# Patient Record
Sex: FEMALE | Race: WHITE
[De-identification: names, ages, dates, MRNs, and addresses within clinical notes are randomized per-mention and may not be internally consistent; named-entity substitution may affect disease eponyms.]

---

## 2018-02-05 ENCOUNTER — HOSPITAL ENCOUNTER (OUTPATIENT)
Dept: HOSPITAL 61 - SURGPAT | Age: 58
Discharge: HOME | End: 2018-02-05
Attending: ORTHOPAEDIC SURGERY
Payer: COMMERCIAL

## 2018-02-05 DIAGNOSIS — I10: ICD-10-CM

## 2018-02-05 DIAGNOSIS — E11.9: ICD-10-CM

## 2018-02-05 DIAGNOSIS — M17.12: Primary | ICD-10-CM

## 2018-02-05 LAB
BACTERIA,URINE: 0 /HPF
BILIRUBIN,URINE: NEGATIVE
CLARITY,URINE: CLEAR
COLOR,URINE: YELLOW
GLUCOSE,URINE: NEGATIVE MG/DL
NITRITE,URINE: NEGATIVE
PH,URINE: 6
PROTEIN,URINE: NEGATIVE MG/DL
RBC,URINE: (no result) /HPF (ref 0–2)
SPECIFIC GRAVITY,URINE: 1.02
SQUAMOUS EPITHELIAL CELL,UR: (no result) /LPF
UROBILINOGEN,URINE: 0.2 MG/DL
WBC,URINE: (no result) /HPF (ref 0–4)

## 2018-02-05 PROCEDURE — 87641 MR-STAPH DNA AMP PROBE: CPT

## 2018-02-05 PROCEDURE — 36415 COLL VENOUS BLD VENIPUNCTURE: CPT

## 2018-02-05 PROCEDURE — 87086 URINE CULTURE/COLONY COUNT: CPT

## 2018-02-05 PROCEDURE — 81001 URINALYSIS AUTO W/SCOPE: CPT

## 2018-02-19 ENCOUNTER — HOSPITAL ENCOUNTER (INPATIENT)
Dept: HOSPITAL 61 - OPSVCIP | Age: 58
LOS: 3 days | Discharge: HOME HEALTH | DRG: 470 | End: 2018-02-22
Attending: ORTHOPAEDIC SURGERY | Admitting: ORTHOPAEDIC SURGERY
Payer: COMMERCIAL

## 2018-02-19 DIAGNOSIS — Z88.6: ICD-10-CM

## 2018-02-19 DIAGNOSIS — Z88.0: ICD-10-CM

## 2018-02-19 DIAGNOSIS — M17.12: Primary | ICD-10-CM

## 2018-02-19 DIAGNOSIS — Z91.018: ICD-10-CM

## 2018-02-19 LAB
INR: 1 (ref 0.8–1.1)
PARTIAL THROMBOPLASTIN TIME: 27 SEC (ref 24–38)
POC GLUCOSE: 138 MG/DL (ref 70–99)
PROTHROMBIN TIME PATIENT: 12.1 SEC (ref 11.7–14)

## 2018-02-19 PROCEDURE — 73560 X-RAY EXAM OF KNEE 1 OR 2: CPT

## 2018-02-19 PROCEDURE — 86901 BLOOD TYPING SEROLOGIC RH(D): CPT

## 2018-02-19 PROCEDURE — 88305 TISSUE EXAM BY PATHOLOGIST: CPT

## 2018-02-19 PROCEDURE — 97530 THERAPEUTIC ACTIVITIES: CPT

## 2018-02-19 PROCEDURE — 97116 GAIT TRAINING THERAPY: CPT

## 2018-02-19 PROCEDURE — 88311 DECALCIFY TISSUE: CPT

## 2018-02-19 PROCEDURE — 97535 SELF CARE MNGMENT TRAINING: CPT

## 2018-02-19 PROCEDURE — 82962 GLUCOSE BLOOD TEST: CPT

## 2018-02-19 PROCEDURE — 85014 HEMATOCRIT: CPT

## 2018-02-19 PROCEDURE — 85730 THROMBOPLASTIN TIME PARTIAL: CPT

## 2018-02-19 PROCEDURE — 86900 BLOOD TYPING SEROLOGIC ABO: CPT

## 2018-02-19 PROCEDURE — 97162 PT EVAL MOD COMPLEX 30 MIN: CPT

## 2018-02-19 PROCEDURE — 97150 GROUP THERAPEUTIC PROCEDURES: CPT

## 2018-02-19 PROCEDURE — 97110 THERAPEUTIC EXERCISES: CPT

## 2018-02-19 PROCEDURE — 36415 COLL VENOUS BLD VENIPUNCTURE: CPT

## 2018-02-19 PROCEDURE — 0SRD0J9 REPLACEMENT OF LEFT KNEE JOINT WITH SYNTHETIC SUBSTITUTE, CEMENTED, OPEN APPROACH: ICD-10-PCS

## 2018-02-19 PROCEDURE — 86850 RBC ANTIBODY SCREEN: CPT

## 2018-02-19 PROCEDURE — 85610 PROTHROMBIN TIME: CPT

## 2018-02-19 PROCEDURE — 97165 OT EVAL LOW COMPLEX 30 MIN: CPT

## 2018-02-19 PROCEDURE — 85018 HEMOGLOBIN: CPT

## 2018-02-19 RX ADMIN — FENTANYL CITRATE 1 MCG: 50 INJECTION INTRAMUSCULAR; INTRAVENOUS at 15:22

## 2018-02-19 RX ADMIN — Medication 1 MG: at 16:52

## 2018-02-19 RX ADMIN — FENTANYL CITRATE 1 MCG: 50 INJECTION INTRAMUSCULAR; INTRAVENOUS at 09:49

## 2018-02-19 RX ADMIN — SODIUM CHLORIDE, SODIUM LACTATE, POTASSIUM CHLORIDE, AND CALCIUM CHLORIDE 1 MLS/HR: .6; .31; .03; .02 INJECTION, SOLUTION INTRAVENOUS at 06:51

## 2018-02-19 RX ADMIN — KETOROLAC TROMETHAMINE 1 MLS/HR: 30 INJECTION, SOLUTION INTRAMUSCULAR at 23:01

## 2018-02-19 RX ADMIN — VANCOMYCIN HYDROCHLORIDE 1 MLS/HR: 1 INJECTION, POWDER, FOR SOLUTION INTRAVENOUS at 07:20

## 2018-02-19 RX ADMIN — WARFARIN SODIUM 1 MG: 7.5 TABLET ORAL at 16:52

## 2018-02-19 RX ADMIN — VANCOMYCIN HYDROCHLORIDE 1 MLS/HR: 1 INJECTION, POWDER, FOR SOLUTION INTRAVENOUS at 22:49

## 2018-02-19 RX ADMIN — KETOROLAC TROMETHAMINE 1: 30 INJECTION, SOLUTION INTRAMUSCULAR at 08:00

## 2018-02-19 RX ADMIN — TRANEXAMIC ACID 1 MLS/HR: 100 INJECTION, SOLUTION INTRAVENOUS at 07:50

## 2018-02-19 RX ADMIN — FENTANYL CITRATE 1 MCG: 50 INJECTION INTRAMUSCULAR; INTRAVENOUS at 10:05

## 2018-02-19 RX ADMIN — PROCHLORPERAZINE EDISYLATE 1 MG: 5 INJECTION INTRAMUSCULAR; INTRAVENOUS at 09:49

## 2018-02-19 RX ADMIN — DEXTROSE AND SODIUM CHLORIDE 1 MLS/HR: 5; .45 INJECTION, SOLUTION INTRAVENOUS at 19:00

## 2018-02-19 RX ADMIN — MELOXICAM 1 MG: 7.5 TABLET ORAL at 06:52

## 2018-02-19 RX ADMIN — TRANEXAMIC ACID 1 MLS/HR: 100 INJECTION, SOLUTION INTRAVENOUS at 08:50

## 2018-02-19 RX ADMIN — MEPERIDINE HYDROCHLORIDE 1 MG: 25 INJECTION INTRAMUSCULAR; INTRAVENOUS; SUBCUTANEOUS at 10:17

## 2018-02-19 RX ADMIN — CELECOXIB 1 MG: 200 CAPSULE ORAL at 21:40

## 2018-02-19 RX ADMIN — DEXTROSE AND SODIUM CHLORIDE 1 MLS/HR: 5; .45 INJECTION, SOLUTION INTRAVENOUS at 16:13

## 2018-02-20 LAB
HEMATOCRIT: 29.5 % (ref 36–47)
HEMOGLOBIN: 9.8 G/DL (ref 12–15.5)
INR: 1.3 (ref 0.8–1.1)
MEAN CORPUSCULAR HGB CONC: 33 G/DL (ref 31–37)
PROTHROMBIN TIME PATIENT: 15.5 SEC (ref 11.7–14)

## 2018-02-20 RX ADMIN — LISINOPRIL 1 MG: 20 TABLET ORAL at 08:45

## 2018-02-20 RX ADMIN — WARFARIN SODIUM 1 MG: 5 TABLET ORAL at 16:31

## 2018-02-20 RX ADMIN — Medication 1 EACH: at 10:32

## 2018-02-20 RX ADMIN — Medication 1 MG: at 07:53

## 2018-02-20 RX ADMIN — VANCOMYCIN HYDROCHLORIDE 1 MLS/HR: 1 INJECTION, POWDER, FOR SOLUTION INTRAVENOUS at 07:19

## 2018-02-20 RX ADMIN — HYDROCODONE BITARTRATE AND ACETAMINOPHEN 1 TAB: 10; 325 TABLET ORAL at 18:13

## 2018-02-20 RX ADMIN — OXYCODONE HYDROCHLORIDE AND ACETAMINOPHEN 1 TAB: 5; 325 TABLET ORAL at 07:53

## 2018-02-20 RX ADMIN — ZOLPIDEM TARTRATE 1 MG: 5 TABLET ORAL at 22:39

## 2018-02-20 RX ADMIN — Medication 1 MG: at 16:31

## 2018-02-20 RX ADMIN — CELECOXIB 1 MG: 200 CAPSULE ORAL at 20:50

## 2018-02-20 RX ADMIN — KETOROLAC TROMETHAMINE 1 MLS/HR: 30 INJECTION, SOLUTION INTRAMUSCULAR at 04:43

## 2018-02-20 RX ADMIN — OXYCODONE HYDROCHLORIDE AND ACETAMINOPHEN 1 TAB: 5; 325 TABLET ORAL at 03:32

## 2018-02-20 RX ADMIN — MULTIPLE VITAMINS W/ MINERALS TAB 1 TAB: TAB at 08:45

## 2018-02-20 RX ADMIN — CELECOXIB 1 MG: 200 CAPSULE ORAL at 08:44

## 2018-02-20 RX ADMIN — OXYCODONE HYDROCHLORIDE AND ACETAMINOPHEN 1 TAB: 7.5; 325 TABLET ORAL at 21:45

## 2018-02-20 RX ADMIN — OXYCODONE HYDROCHLORIDE AND ACETAMINOPHEN 1 TAB: 5; 325 TABLET ORAL at 12:29

## 2018-02-20 RX ADMIN — SENNOSIDES AND DOCUSATE SODIUM 1 TAB: 8.6; 5 TABLET ORAL at 08:45

## 2018-02-21 LAB
HEMATOCRIT: 28 % (ref 36–47)
HEMOGLOBIN: 9.4 G/DL (ref 12–15.5)
INR: 1.4 (ref 0.8–1.1)
MEAN CORPUSCULAR HGB CONC: 34 G/DL (ref 31–37)
PROTHROMBIN TIME PATIENT: 16.5 SEC (ref 11.7–14)

## 2018-02-21 RX ADMIN — MULTIPLE VITAMINS W/ MINERALS TAB 1 TAB: TAB at 07:41

## 2018-02-21 RX ADMIN — HYDROCODONE BITARTRATE AND ACETAMINOPHEN 1 TAB: 10; 325 TABLET ORAL at 00:43

## 2018-02-21 RX ADMIN — HYDROCODONE BITARTRATE AND ACETAMINOPHEN 1 TAB: 10; 325 TABLET ORAL at 22:35

## 2018-02-21 RX ADMIN — ZOLPIDEM TARTRATE 1 MG: 5 TABLET ORAL at 21:08

## 2018-02-21 RX ADMIN — WARFARIN SODIUM 1 MG: 5 TABLET ORAL at 16:03

## 2018-02-21 RX ADMIN — CELECOXIB 1 MG: 200 CAPSULE ORAL at 21:08

## 2018-02-21 RX ADMIN — Medication 1 MG: at 16:03

## 2018-02-21 RX ADMIN — LISINOPRIL 1 MG: 20 TABLET ORAL at 09:00

## 2018-02-21 RX ADMIN — Medication 1 EACH: at 14:18

## 2018-02-21 RX ADMIN — HYDROCODONE BITARTRATE AND ACETAMINOPHEN 1 TAB: 10; 325 TABLET ORAL at 09:43

## 2018-02-21 RX ADMIN — Medication 1 MG: at 07:41

## 2018-02-21 RX ADMIN — SENNOSIDES AND DOCUSATE SODIUM 1 TAB: 8.6; 5 TABLET ORAL at 07:42

## 2018-02-21 RX ADMIN — HYDROCODONE BITARTRATE AND ACETAMINOPHEN 1 TAB: 10; 325 TABLET ORAL at 03:52

## 2018-02-21 RX ADMIN — CELECOXIB 1 MG: 200 CAPSULE ORAL at 07:41

## 2018-02-21 RX ADMIN — HYDROCODONE BITARTRATE AND ACETAMINOPHEN 1 TAB: 10; 325 TABLET ORAL at 12:56

## 2018-02-21 RX ADMIN — HYDROCODONE BITARTRATE AND ACETAMINOPHEN 1 TAB: 10; 325 TABLET ORAL at 19:28

## 2018-02-21 RX ADMIN — HYDROCODONE BITARTRATE AND ACETAMINOPHEN 1 TAB: 10; 325 TABLET ORAL at 16:03

## 2018-02-21 RX ADMIN — HYDROCODONE BITARTRATE AND ACETAMINOPHEN 1 TAB: 10; 325 TABLET ORAL at 06:57

## 2018-02-22 VITALS — DIASTOLIC BLOOD PRESSURE: 50 MMHG | SYSTOLIC BLOOD PRESSURE: 93 MMHG

## 2018-02-22 LAB
HEMATOCRIT: 27.3 % (ref 36–47)
HEMOGLOBIN: 9.4 G/DL (ref 12–15.5)
INR: 1.5 (ref 0.8–1.1)
MEAN CORPUSCULAR HGB CONC: 34 G/DL (ref 31–37)
PROTHROMBIN TIME PATIENT: 17.2 SEC (ref 11.7–14)

## 2018-02-22 RX ADMIN — Medication 1 MG: at 07:44

## 2018-02-22 RX ADMIN — CELECOXIB 1 MG: 200 CAPSULE ORAL at 07:45

## 2018-02-22 RX ADMIN — HYDROCODONE BITARTRATE AND ACETAMINOPHEN 1 TAB: 10; 325 TABLET ORAL at 07:47

## 2018-02-22 RX ADMIN — HYDROCODONE BITARTRATE AND ACETAMINOPHEN 1 TAB: 10; 325 TABLET ORAL at 14:33

## 2018-02-22 RX ADMIN — LISINOPRIL 1 MG: 20 TABLET ORAL at 07:45

## 2018-02-22 RX ADMIN — WARFARIN SODIUM 1 MG: 5 TABLET ORAL at 14:32

## 2018-02-22 RX ADMIN — HYDROCODONE BITARTRATE AND ACETAMINOPHEN 1 TAB: 10; 325 TABLET ORAL at 11:13

## 2018-02-22 RX ADMIN — MULTIPLE VITAMINS W/ MINERALS TAB 1 TAB: TAB at 07:45

## 2018-02-22 RX ADMIN — SENNOSIDES AND DOCUSATE SODIUM 1 TAB: 8.6; 5 TABLET ORAL at 07:44

## 2018-02-22 RX ADMIN — HYDROCODONE BITARTRATE AND ACETAMINOPHEN 1 TAB: 10; 325 TABLET ORAL at 04:49

## 2018-02-22 RX ADMIN — Medication 1 EACH: at 10:39

## 2018-02-22 RX ADMIN — HYDROCODONE BITARTRATE AND ACETAMINOPHEN 1 TAB: 10; 325 TABLET ORAL at 01:43

## 2018-10-08 ENCOUNTER — HOSPITAL ENCOUNTER (OUTPATIENT)
Dept: HOSPITAL 61 - SURGPAT | Age: 58
Discharge: HOME | End: 2018-10-08
Attending: ORTHOPAEDIC SURGERY
Payer: COMMERCIAL

## 2018-10-08 DIAGNOSIS — Z01.818: Primary | ICD-10-CM

## 2018-10-08 DIAGNOSIS — M17.11: ICD-10-CM

## 2018-10-08 DIAGNOSIS — Z88.5: ICD-10-CM

## 2018-10-08 DIAGNOSIS — Z88.0: ICD-10-CM

## 2018-10-08 LAB
APTT BLD: 27 SEC (ref 24–38)
APTT PPP: YELLOW S
BACTERIA #/AREA URNS HPF: 0 /HPF
BASOPHILS # BLD AUTO: 0.1 X10^3/UL (ref 0–0.2)
BASOPHILS NFR BLD: 1 % (ref 0–3)
BILIRUB UR QL STRIP: NEGATIVE
EOSINOPHIL NFR BLD: 0.3 X10^3/UL (ref 0–0.7)
EOSINOPHIL NFR BLD: 4 % (ref 0–3)
ERYTHROCYTE [DISTWIDTH] IN BLOOD BY AUTOMATED COUNT: 14.4 % (ref 11.5–14.5)
FIBRINOGEN PPP-MCNC: CLEAR MG/DL
HCT VFR BLD CALC: 41.9 % (ref 36–47)
HGB BLD-MCNC: 14.5 G/DL (ref 12–15.5)
LYMPHOCYTES # BLD: 3.3 X10^3/UL (ref 1–4.8)
LYMPHOCYTES NFR BLD AUTO: 37 % (ref 24–48)
MCH RBC QN AUTO: 30 PG (ref 25–35)
MCHC RBC AUTO-ENTMCNC: 35 G/DL (ref 31–37)
MCV RBC AUTO: 86 FL (ref 79–100)
MONO #: 0.6 X10^3/UL (ref 0–1.1)
MONOCYTES NFR BLD: 6 % (ref 0–9)
NEUT #: 4.8 X10^3UL (ref 1.8–7.7)
NEUTROPHILS NFR BLD AUTO: 52 % (ref 31–73)
NITRITE UR QL STRIP: NEGATIVE
PH UR STRIP: 7.5 [PH]
PLATELET # BLD AUTO: 201 X10^3/UL (ref 140–400)
PROT UR STRIP-MCNC: NEGATIVE MG/DL
PROTHROMBIN TIME: 12.8 SEC (ref 11.7–14)
RBC # BLD AUTO: 4.85 X10^6/UL (ref 3.5–5.4)
RBC #/AREA URNS HPF: 0 /HPF (ref 0–2)
SQUAMOUS #/AREA URNS LPF: (no result) /LPF
UROBILINOGEN UR-MCNC: 0.2 MG/DL
WBC # BLD AUTO: 9.1 X10^3/UL (ref 4–11)
WBC #/AREA URNS HPF: 0 /HPF (ref 0–4)

## 2018-10-08 PROCEDURE — 85730 THROMBOPLASTIN TIME PARTIAL: CPT

## 2018-10-08 PROCEDURE — 85651 RBC SED RATE NONAUTOMATED: CPT

## 2018-10-08 PROCEDURE — 87641 MR-STAPH DNA AMP PROBE: CPT

## 2018-10-08 PROCEDURE — 36415 COLL VENOUS BLD VENIPUNCTURE: CPT

## 2018-10-08 PROCEDURE — 81001 URINALYSIS AUTO W/SCOPE: CPT

## 2018-10-08 PROCEDURE — 85610 PROTHROMBIN TIME: CPT

## 2018-10-08 PROCEDURE — 87086 URINE CULTURE/COLONY COUNT: CPT

## 2018-10-08 PROCEDURE — 85025 COMPLETE CBC W/AUTO DIFF WBC: CPT

## 2018-10-22 ENCOUNTER — HOSPITAL ENCOUNTER (INPATIENT)
Dept: HOSPITAL 61 - OPSVCIP | Age: 58
LOS: 3 days | Discharge: HOME HEALTH | DRG: 470 | End: 2018-10-25
Attending: ORTHOPAEDIC SURGERY | Admitting: ORTHOPAEDIC SURGERY
Payer: COMMERCIAL

## 2018-10-22 VITALS — SYSTOLIC BLOOD PRESSURE: 107 MMHG | DIASTOLIC BLOOD PRESSURE: 58 MMHG

## 2018-10-22 VITALS — SYSTOLIC BLOOD PRESSURE: 106 MMHG | DIASTOLIC BLOOD PRESSURE: 62 MMHG

## 2018-10-22 VITALS — SYSTOLIC BLOOD PRESSURE: 95 MMHG | DIASTOLIC BLOOD PRESSURE: 62 MMHG

## 2018-10-22 VITALS — DIASTOLIC BLOOD PRESSURE: 54 MMHG | SYSTOLIC BLOOD PRESSURE: 106 MMHG

## 2018-10-22 VITALS — DIASTOLIC BLOOD PRESSURE: 53 MMHG | SYSTOLIC BLOOD PRESSURE: 100 MMHG

## 2018-10-22 VITALS — DIASTOLIC BLOOD PRESSURE: 60 MMHG | SYSTOLIC BLOOD PRESSURE: 110 MMHG

## 2018-10-22 VITALS — SYSTOLIC BLOOD PRESSURE: 113 MMHG | DIASTOLIC BLOOD PRESSURE: 58 MMHG

## 2018-10-22 VITALS — BODY MASS INDEX: 34.07 KG/M2 | WEIGHT: 230 LBS | HEIGHT: 69 IN

## 2018-10-22 VITALS — DIASTOLIC BLOOD PRESSURE: 69 MMHG | SYSTOLIC BLOOD PRESSURE: 121 MMHG

## 2018-10-22 VITALS — SYSTOLIC BLOOD PRESSURE: 109 MMHG | DIASTOLIC BLOOD PRESSURE: 62 MMHG

## 2018-10-22 DIAGNOSIS — Z91.048: ICD-10-CM

## 2018-10-22 DIAGNOSIS — Z91.018: ICD-10-CM

## 2018-10-22 DIAGNOSIS — M17.11: Primary | ICD-10-CM

## 2018-10-22 DIAGNOSIS — Z96.652: ICD-10-CM

## 2018-10-22 DIAGNOSIS — Z88.5: ICD-10-CM

## 2018-10-22 DIAGNOSIS — Z88.0: ICD-10-CM

## 2018-10-22 LAB
APTT BLD: 28 SEC (ref 24–38)
PROTHROMBIN TIME: 12.8 SEC (ref 11.7–14)

## 2018-10-22 PROCEDURE — 85610 PROTHROMBIN TIME: CPT

## 2018-10-22 PROCEDURE — 88311 DECALCIFY TISSUE: CPT

## 2018-10-22 PROCEDURE — 0SRC069 REPLACEMENT OF RIGHT KNEE JOINT WITH OXIDIZED ZIRCONIUM ON POLYETHYLENE SYNTHETIC SUBSTITUTE, CEMENTED, OPEN APPROACH: ICD-10-PCS | Performed by: ORTHOPAEDIC SURGERY

## 2018-10-22 PROCEDURE — 86850 RBC ANTIBODY SCREEN: CPT

## 2018-10-22 PROCEDURE — 85730 THROMBOPLASTIN TIME PARTIAL: CPT

## 2018-10-22 PROCEDURE — 86900 BLOOD TYPING SEROLOGIC ABO: CPT

## 2018-10-22 PROCEDURE — C1769 GUIDE WIRE: HCPCS

## 2018-10-22 PROCEDURE — 88305 TISSUE EXAM BY PATHOLOGIST: CPT

## 2018-10-22 PROCEDURE — 99406 BEHAV CHNG SMOKING 3-10 MIN: CPT

## 2018-10-22 PROCEDURE — A7015 AEROSOL MASK USED W NEBULIZE: HCPCS

## 2018-10-22 PROCEDURE — 86901 BLOOD TYPING SEROLOGIC RH(D): CPT

## 2018-10-22 PROCEDURE — 85018 HEMOGLOBIN: CPT

## 2018-10-22 PROCEDURE — 73560 X-RAY EXAM OF KNEE 1 OR 2: CPT

## 2018-10-22 PROCEDURE — 85014 HEMATOCRIT: CPT

## 2018-10-22 PROCEDURE — 36415 COLL VENOUS BLD VENIPUNCTURE: CPT

## 2018-10-22 RX ADMIN — HYDROCODONE BITARTRATE AND ACETAMINOPHEN PRN TAB: 10; 325 TABLET ORAL at 21:20

## 2018-10-22 RX ADMIN — HYDROCODONE BITARTRATE AND ACETAMINOPHEN PRN TAB: 10; 325 TABLET ORAL at 14:29

## 2018-10-22 RX ADMIN — FENTANYL CITRATE PRN MCG: 50 INJECTION INTRAMUSCULAR; INTRAVENOUS at 09:57

## 2018-10-22 RX ADMIN — FENTANYL CITRATE PRN MCG: 50 INJECTION INTRAMUSCULAR; INTRAVENOUS at 10:18

## 2018-10-22 RX ADMIN — HYDROCODONE BITARTRATE AND ACETAMINOPHEN PRN TAB: 10; 325 TABLET ORAL at 17:37

## 2018-10-22 RX ADMIN — DEXTROSE AND SODIUM CHLORIDE SCH MLS/HR: 5; .45 INJECTION, SOLUTION INTRAVENOUS at 14:30

## 2018-10-22 RX ADMIN — CELECOXIB SCH MG: 100 CAPSULE ORAL at 21:09

## 2018-10-22 RX ADMIN — DEXTROSE AND SODIUM CHLORIDE SCH MLS/HR: 5; .45 INJECTION, SOLUTION INTRAVENOUS at 21:03

## 2018-10-22 RX ADMIN — FENTANYL CITRATE PRN MCG: 50 INJECTION INTRAMUSCULAR; INTRAVENOUS at 10:04

## 2018-10-22 RX ADMIN — GABAPENTIN SCH MG: 300 CAPSULE ORAL at 21:09

## 2018-10-22 RX ADMIN — Medication SCH MG: at 16:34

## 2018-10-22 RX ADMIN — TIZANIDINE PRN MG: 4 TABLET ORAL at 21:18

## 2018-10-22 RX ADMIN — FENTANYL CITRATE PRN MCG: 50 INJECTION INTRAMUSCULAR; INTRAVENOUS at 10:52

## 2018-10-22 RX ADMIN — FENTANYL CITRATE PRN MCG: 50 INJECTION INTRAMUSCULAR; INTRAVENOUS at 11:45

## 2018-10-22 NOTE — PDOC4
Operative Note


Operative Note


Date of procedure: 10/22/2018





Surgeon: Levi Alexander





Assistant: JORY Burt





Preoperative diagnosis advanced primary right knee degenerative joint disease





Postoperative diagnosis: Same





Procedure performed: Right total knee arthroplasty





Anesthesia: Gen.





Tourniquet time: 47 minutes





Blood loss: 50 mL





Components inserted: Leo and nephew Oxinium size 5 femur, size 3 journey 2 

tibial baseplate, 23 biconvex patella, 9 mm thick polyethylene articular insert





Findings: Advanced primary degenerative joint disease of right knee





Reason for procedure: Patient is a very pleasant 58-year-old female who is 

happy with her outcome after a left total knee arthroplasty with myself a short 

while ago. He had initially seen her for bilateral knee pain. Her clinical and 

radiographic examination were consistent with the above preoperative diagnosis. 

She had tried and failed conservative therapies including interarticular 

injections, weight loss, anti-inflammatories and despite this her pain 

progressed, her left side was worse we did decide first. When she had recovered 

well enough from surgery, she wished to proceed with the right side. We 

reviewed the risks, benefits, and alternatives and she elected to proceed with 

right total knee arthroplasty.





Description of procedure: Patient was greeted in the preoperative holding area 

by myself for the correct extremity was verified and marked. She was taken back 

to the operative suite and her antibiotic started as she was brought back. Once 

in the operating room, she was transferred gently supine to the operating table 

and secured the bed with all pressure points padded. She underwent successful 

induction of a general anesthetic. A padded bump laterally at her hip and 

padded bar across foot of the bed to help maintain her leg in 90 passively at 

her knee were applied to the OR table. Nonsterile tourniquet taped in place to 

her right upper thigh. Examination under anesthesia demonstrated range of 

motion from 0-135, right knee was stable to varus and valgus in extension and 

30 of flexion. We then proceeded prep and drape right lower extremity in our 

usual sterile fashion including an Ioban Sanders. We then conducted our 

standard preoperative timeout. I palpated and marked surface surface anatomy 

and carmelina a line from my standard anterior midline skin incision. Tourniquet was 

insufflated after an Esmarch was applied to the right lower extremity. 

Tourniquet was set at 2 or 50 mmHg. I then incised skin with a scalpel and 

dissected down to the extensor mechanism with electrocautery, cauterizing 

bleeders as a were encountered. Identified the quadriceps tendon, borders of 

the patella and patellar tendon as well as tibial tubercle. After this, I 

performed my standard medial parapatellar arthrotomy. I then performed my 

medial release with combination of Wheeler and electrocautery. I then bluntly 

dissected the fat pad off the posterior aspect of the patellar tendon and 

protected the tendon with an Army-[place] while I excised the fat pad. The knee 

was then flexed, I marked the epicondylar axis and Whitesides line. After this, 

I took down the cruciate ligaments. I then gained entry to the distal femur 

with the drill, decompressing as I entered. I then placed my long 

intramedullary guide melba and impacted my guide into position followed by 

placing 3 pins his care. I then made my distal femoral cut after placing my Z 

retractors. I removed all loose bony debris. I then removed my distal femoral 

cutting block, directed my attention to incising, it was a size 5. After this, 

I placed my distal femoral 5 in 1 cutting block in position, I impacted it to 

fully seated and placed my threaded pins. I then made my distal femoral cuts 

after repositioned my retractors. I removed all loose bony pieces. I took care 

to remove any osteophytes posteriorly. After this, we repositioned our 

retractors and a padded our popliteal retractor. The tibia was subluxed 

anteriorly and I used my extra medullary tibial cutting guide and pinned this 

into position I then made my proximal tibial cut. The bony remnant was 

delivered from the operative field with circumferential electrocautery. The leg 

was brought out into extension after the retractors were removed. The spacer 

block and drop melba confirmed good alignment and stability. A lamina  

was then introduced into the knee, the menisci were excised, leaving a rim 

peripherally for later identification. We flexed the knee and repositioned at 

all of our retractors. I then sized and pinned into place and a trial tibial 

baseplate, size 3. We then reamed and punched for the fins. I then impacted the 

trial femoral component in place, we then reamed and punched for the cam 

portion. After this, a trial polyethylene was introduced, size 10, but she does 

not quite have full extension and I downsized to a 9. This gave her range of 

motion from 0-140, knee that was stable to varus and valgus in extension and 

mid flexion. I then directed my attention to the patella and sized and reamed 

for a 23 biconvex patella. With all trial components and there was excellent 

patellar tracking. All trial components were then removed and the bony surfaces 

were thoroughly irrigated. After this, we proceeded cement in place our tibial 

component followed by femoral component taking care to remove excess bone 

cement. A trial 9 mm articular insert was placed and secured. The cement was 

allowed to polymerize with the leg in extension. I then clamped my patellar 

button in place with cement as well. I then injected my periarticular mixture 

into the sonia-incisional soft tissues. After cement hardened, took knee through 

range of motion tested stability again and was happy with the above size. We 

then removed the trial articular insert, thoroughly irrigated the operative 

field again and then secured and clamped in place the 9 mm thick polyethylene 

articular insert. Range of motion and stability were the same. Tourniquet was 

then let down and some bleeders were cauterized. She had some oozing from the 

distal femur and expose bone and therefore I placed a 1/8 inch Hemovac exiting 

superolaterally from the knee. After this,, the arthrotomy was closed with 

simple interrupted #1 Vicryl, arthrotomy closure tested in flexion and no 

extravasation of blood was noted. After this, inverted interrupted 2-0 Vicryl 

in a multilayered fashion was used for subcutaneous tissue and running 4-0 

Monocryl in a buried subcuticular fashion was used for skin. Prior to wound 

closure all counts correct 2. No complications. At the conclusion of the 

surgery, the leg was cleansed and dried and our [employer] danii wound 

dressing was applied. She is awake from anesthesia and transferred gently 

supine to the recovery room cart and taken to PACU in a stable and expected 

condition. Postoperative plan is to admit her to the joint center for DVT and 

antibiotic prophylaxis as well as to begin her rehabilitation. She can weight-

bear as tolerated.











LEVI ALEXANDER II, MD Oct 22, 2018 09:51

## 2018-10-23 VITALS — SYSTOLIC BLOOD PRESSURE: 97 MMHG | DIASTOLIC BLOOD PRESSURE: 55 MMHG

## 2018-10-23 VITALS — SYSTOLIC BLOOD PRESSURE: 132 MMHG | DIASTOLIC BLOOD PRESSURE: 79 MMHG

## 2018-10-23 VITALS — DIASTOLIC BLOOD PRESSURE: 61 MMHG | SYSTOLIC BLOOD PRESSURE: 105 MMHG

## 2018-10-23 VITALS — DIASTOLIC BLOOD PRESSURE: 55 MMHG | SYSTOLIC BLOOD PRESSURE: 105 MMHG

## 2018-10-23 VITALS — DIASTOLIC BLOOD PRESSURE: 64 MMHG | SYSTOLIC BLOOD PRESSURE: 125 MMHG

## 2018-10-23 LAB
HCT VFR BLD CALC: 33.9 % (ref 36–47)
HGB BLD-MCNC: 11.6 G/DL (ref 12–15.5)
MCHC RBC AUTO-ENTMCNC: 34 G/DL (ref 31–37)
PROTHROMBIN TIME: 14.7 SEC (ref 11.7–14)

## 2018-10-23 RX ADMIN — HYDROCODONE BITARTRATE AND ACETAMINOPHEN PRN TAB: 10; 325 TABLET ORAL at 06:25

## 2018-10-23 RX ADMIN — TIZANIDINE PRN MG: 4 TABLET ORAL at 20:36

## 2018-10-23 RX ADMIN — Medication PRN EACH: at 12:19

## 2018-10-23 RX ADMIN — GABAPENTIN SCH MG: 300 CAPSULE ORAL at 20:36

## 2018-10-23 RX ADMIN — CELECOXIB SCH MG: 100 CAPSULE ORAL at 08:05

## 2018-10-23 RX ADMIN — CELECOXIB SCH MG: 100 CAPSULE ORAL at 20:36

## 2018-10-23 RX ADMIN — HYDROCODONE BITARTRATE AND ACETAMINOPHEN PRN TAB: 10; 325 TABLET ORAL at 10:10

## 2018-10-23 RX ADMIN — Medication SCH MG: at 17:42

## 2018-10-23 RX ADMIN — HYDROCODONE BITARTRATE AND ACETAMINOPHEN PRN TAB: 10; 325 TABLET ORAL at 01:59

## 2018-10-23 RX ADMIN — HYDROCODONE BITARTRATE AND ACETAMINOPHEN PRN TAB: 10; 325 TABLET ORAL at 20:37

## 2018-10-23 RX ADMIN — LISINOPRIL SCH MG: 20 TABLET ORAL at 09:00

## 2018-10-23 RX ADMIN — HYDROCODONE BITARTRATE AND ACETAMINOPHEN PRN TAB: 10; 325 TABLET ORAL at 17:43

## 2018-10-23 RX ADMIN — HYDROCODONE BITARTRATE AND ACETAMINOPHEN PRN TAB: 10; 325 TABLET ORAL at 13:15

## 2018-10-23 RX ADMIN — MULTIPLE VITAMINS W/ MINERALS TAB SCH TAB: TAB at 08:05

## 2018-10-23 RX ADMIN — SENNOSIDES AND DOCUSATE SODIUM SCH TAB: 8.6; 5 TABLET ORAL at 08:05

## 2018-10-23 RX ADMIN — Medication SCH MG: at 08:05

## 2018-10-23 NOTE — DISCH
DISCHARGE INSTRUCTIONS


Condition on Discharge


Condition on Discharge:  Stable





Activity After Discharge


Activity Instructions for Disc:  Walk in house


Bathing Instructions:  Shower-keep dressing dry, No Tub Bath until see 


Lifting Instructions after Dis:  No heavy lifting, No pulling or pushing, Do 

not lift >10 pounds


Exercise Instruction after Dis:  Exercise per therapy


Driving Instructions after Dis:  Do not drive


Weight Bearing Status after Di:  No restrictions, Full weight bearing, As 

tolerated





Diet after Discharge


Diet after Discharge:  Diabetic No Calorie Level


Diet Texture:  Regular


Liquid Texture:  Thin Liquid


Swallowing Supervision:  None needed





Wound Incision Care


Wound/Incision Care:  Ice to area for comfort, Keep wound/cast CDI, Keep wound 

elevated, Do not change dressing





Checks after Discharge


Checks after discharge:  Check blood sugar, ac/hs, Check your Temp as needed





Community/Resources/Services


Services at Discharge:  Home Health Care Services





Contacting the  after DC


Call your doctor for:  Concerns you may have





Follow-Up


Follow up with:  Mike in 2 wks





Treatment/Equipment after DC


Adaptive Equipment Issued:  Front wheeled walker





Warfarin Follow-Up


Warfarin Follow UP:  per Pharmacy











YADIRA ALEXANDER II, MD Oct 23, 2018 09:45

## 2018-10-23 NOTE — DISCH
DISCHARGE WITH HOME HEALTH


DISCHARGE INFORMATION:


Discharge Date:  Oct 24, 2018


Final Diagnosis:


Advanced right knee degenerative joint disease, status post arthroplasty


Condition on Discharge:  Stable





HOME HEALTH:


Face to Face:


I certify this patient is under my care and that I, or a nurse practitioner or 

physician's assistant working with me, had a face to face encounter that meets 

the physician face to face encounter requirements with this patient on [].


Medical Complications:  S/P Joint Replacement


Skilled Nursing For:  Admin/Educate Injections


Physical Therapy For:  Evalulation/Treatment


Occupational Therapy For:  Evaluation/Treatment


Pt Meets Homebound Status:  Poor coordination w/ amb., Unsteady balance w/ amb,





POST DISCHARGE ORDERS:


Activity Instructions for Disc:  Walk in house


Weight Bearing Status after Di:  No restrictions, Full weight bearing, As 

tolerated


Bathing Instructions:  Shower-keep dressing dry, No Tub Bath until see 


Wound/Incision Care:  Ice to area for comfort, Keep wound/cast CDI, Keep wound 

elevated, Do not change dressing





CHECKS AFTER DISCHARGE:


Checks after discharge:  Check blood sugar, ac/hs, Check your Temp as needed





FOLLOW-UP:


Follow up with:  Mike in 2 wks


Warfarin Follow UP:  per Pharmacy





TREATMENT/EQUIPMENT ORDERS:


Adaptive Equipment Issued:  Front wheeled walker





CERTIFICATION STATEMENT:


Certification Statement:


Certification Statement: Based on the above finding, I certify that this 

patient is confined to the home and needs intermittent skilled nursing care, 

physical therapy and/or speech therapy, or continues to need occupational 

therapy.~ This patient is under my care, and I have initiated the establishment 

of the plan of care.~ This patient will be followed by myself or a community 

physician who will periodically review the plan of care.


Home Meds


Reported Medications


Lisinopril/Hydrochlorothiazide (LISINOPRIL-HCTZ 20-12.5 MG TAB) 1 Each Tablet, 

1 TAB PO DAILY, #30 TAB 5 Refills


   10/8/18


Ferrous Sulfate (FERROUS SULFATE) 325 Mg Tablet, 325 MG PO DAILY, TAB


   10/8/18


Tizanidine Hcl (TIZANIDINE HCL) 4 Mg Tablet, 4 MG PO HS PRN for MUSCLE SPASMS, 

TAB


   10/8/18


Gabapentin (GABAPENTIN) 600 Mg Tablet, 600 MG PO HS, TAB


   10/8/18


Discontinued Reported Medications


Meloxicam (MELOXICAM) 15 Mg Tablet, 15 MG PO DAILY, TAB


   10/8/18











YADIRA ALEXANDER II, MD Oct 23, 2018 09:48

## 2018-10-23 NOTE — PDOC
ORTHO PROGRESS NOTES


Subjective


She feels like her pain is tolerable. She has been working on the 

rehabilitation exercises while sitting.


Vitals





Vital Signs








  Date Time  Temp Pulse Resp B/P (MAP) Pulse Ox O2 Delivery O2 Flow Rate FiO2


 


10/23/18 08:06  74 22 105/55 (72)  Room Air  


 


10/23/18 07:31     96   


 


10/23/18 06:30 97.8       





 97.8       


 


10/22/18 12:42       2.0 








Labs





Laboratory Tests








Test


 10/22/18


06:25 10/23/18


04:00


 


Prothrombin Time


 12.8 SEC


(11.7-14.0) 14.7 SEC


(11.7-14.0)


 


Prothromb Time International


Ratio 1.0 (0.8-1.1) 


 1.2 (0.8-1.1) 





 


Activated Partial


Thromboplast Time 28 SEC (24-38) 


 





 


Hemoglobin


 


 11.6 g/dL


(12.0-15.5)


 


Hematocrit


 


 33.9 %


(36.0-47.0)


 


Mean Corpuscular Hemoglobin


Concent 


 34 g/dL


(31-37)








Laboratory Tests








Test


 10/23/18


04:00


 


Hemoglobin


 11.6 g/dL


(12.0-15.5)


 


Hematocrit


 33.9 %


(36.0-47.0)


 


Mean Corpuscular Hemoglobin


Concent 34 g/dL


(31-37)


 


Prothrombin Time


 14.7 SEC


(11.7-14.0)


 


Prothromb Time International


Ratio 1.2 (0.8-1.1) 











Notes


Dressing has some bloody drainage at the inferior portion. An effusion is 

present. Range of motion is 10 to 80. Normal motor and sensation is present 

distally


Assessment and Plan


I think she may be ready for discharge with home health care tomorrow. We will 

see how she does today.











YADIRA ALEXANDER II, MD Oct 23, 2018 09:50

## 2018-10-24 VITALS — DIASTOLIC BLOOD PRESSURE: 62 MMHG | SYSTOLIC BLOOD PRESSURE: 124 MMHG

## 2018-10-24 VITALS — SYSTOLIC BLOOD PRESSURE: 110 MMHG | DIASTOLIC BLOOD PRESSURE: 54 MMHG

## 2018-10-24 LAB
HCT VFR BLD CALC: 32.1 % (ref 36–47)
HGB BLD-MCNC: 11.1 G/DL (ref 12–15.5)
MCHC RBC AUTO-ENTMCNC: 35 G/DL (ref 31–37)
PROTHROMBIN TIME: 18.1 SEC (ref 11.7–14)

## 2018-10-24 RX ADMIN — OXYCODONE HYDROCHLORIDE AND ACETAMINOPHEN PRN TAB: 7.5; 325 TABLET ORAL at 12:07

## 2018-10-24 RX ADMIN — FENTANYL CITRATE PRN MCG: 50 INJECTION INTRAMUSCULAR; INTRAVENOUS at 16:01

## 2018-10-24 RX ADMIN — Medication SCH MG: at 08:34

## 2018-10-24 RX ADMIN — HYDROCODONE BITARTRATE AND ACETAMINOPHEN PRN TAB: 10; 325 TABLET ORAL at 01:40

## 2018-10-24 RX ADMIN — HYDROCODONE BITARTRATE AND ACETAMINOPHEN PRN TAB: 10; 325 TABLET ORAL at 04:42

## 2018-10-24 RX ADMIN — SENNOSIDES AND DOCUSATE SODIUM SCH TAB: 8.6; 5 TABLET ORAL at 08:34

## 2018-10-24 RX ADMIN — TIZANIDINE PRN MG: 4 TABLET ORAL at 21:49

## 2018-10-24 RX ADMIN — Medication PRN EACH: at 11:04

## 2018-10-24 RX ADMIN — GABAPENTIN SCH MG: 300 CAPSULE ORAL at 21:49

## 2018-10-24 RX ADMIN — FENTANYL CITRATE PRN MCG: 50 INJECTION INTRAMUSCULAR; INTRAVENOUS at 12:09

## 2018-10-24 RX ADMIN — OXYCODONE HYDROCHLORIDE AND ACETAMINOPHEN PRN TAB: 7.5; 325 TABLET ORAL at 21:49

## 2018-10-24 RX ADMIN — CELECOXIB SCH MG: 100 CAPSULE ORAL at 08:41

## 2018-10-24 RX ADMIN — LISINOPRIL SCH MG: 20 TABLET ORAL at 08:44

## 2018-10-24 RX ADMIN — CELECOXIB SCH MG: 100 CAPSULE ORAL at 21:49

## 2018-10-24 RX ADMIN — MULTIPLE VITAMINS W/ MINERALS TAB SCH TAB: TAB at 08:34

## 2018-10-24 RX ADMIN — HYDROCODONE BITARTRATE AND ACETAMINOPHEN PRN TAB: 10; 325 TABLET ORAL at 12:06

## 2018-10-24 RX ADMIN — OXYCODONE HYDROCHLORIDE AND ACETAMINOPHEN PRN TAB: 7.5; 325 TABLET ORAL at 17:29

## 2018-10-24 RX ADMIN — FENTANYL CITRATE PRN MCG: 50 INJECTION INTRAMUSCULAR; INTRAVENOUS at 20:09

## 2018-10-24 RX ADMIN — Medication SCH MG: at 16:01

## 2018-10-24 NOTE — PDOC
ORTHO PROGRESS NOTES


Subjective


Patient states knee very painful.


Post-op Day:  2


Procedure


Right Total Knee Arthroplasty


Vitals





Vital Signs








  Date Time  Temp Pulse Resp B/P (MAP) Pulse Ox O2 Delivery O2 Flow Rate FiO2


 


10/24/18 05:00 96.8 79 24 124/62 (82) 94 Room Air  





 96.8       








Labs





Laboratory Tests








Test


 10/23/18


04:00 10/24/18


04:00


 


Hemoglobin


 11.6 g/dL


(12.0-15.5) 11.1 g/dL


(12.0-15.5)


 


Hematocrit


 33.9 %


(36.0-47.0) 32.1 %


(36.0-47.0)


 


Mean Corpuscular Hemoglobin


Concent 34 g/dL


(31-37) 35 g/dL


(31-37)


 


Prothrombin Time


 14.7 SEC


(11.7-14.0) 18.1 SEC


(11.7-14.0)


 


Prothromb Time International


Ratio 1.2 (0.8-1.1) 


 1.6 (0.8-1.1) 











Laboratory Tests








Test


 10/24/18


04:00


 


Hemoglobin


 11.1 g/dL


(12.0-15.5)


 


Hematocrit


 32.1 %


(36.0-47.0)


 


Mean Corpuscular Hemoglobin


Concent 35 g/dL


(31-37)


 


Prothrombin Time


 18.1 SEC


(11.7-14.0)


 


Prothromb Time International


Ratio 1.6 (0.8-1.1) 











Assessment and Plan


Knee with swelling


Dressing with old dried blood visible


N/V intact distally


encourage PT and knee flexion.


Pain control











FARNAZ SHELTON Oct 24, 2018 07:34

## 2018-10-24 NOTE — PATHOLOGY
[facility] Accession Number: [id number]

.                                                                01

Material submitted:                                        .

BONE,RIGHT KNEE

.                                                                01

Clinical history:                                          .

Knee pain

.                                                                02

**********************************************************************

Diagnosis:

Segments of bone and soft tissue, right total knee arthroplasty:

- Advanced degenerative arthritis.

.

(JPM:vjm;10/23/2018)

[facility]/10/23/2018

**********************************************************************

.                                                                02

Electronically signed:                                     .

Nas Perez MD, Pathologist

NPI- [id number]

.                                                                01

Gross description:                                         .

The specimen is received in formalin, labeled "Monika Leal, right knee

bone", consists of multiple segments of tan-yellow bone, yellow lobulated

and gray-white fibrous tissue containing recognizable portions of tibial

plateau, patella,and meniscus measuring 11.5 x 9.0 x 2.5 cm in aggregate.

Eburnation and peripheral osteophytes are identified.  The attached

fibrous soft tissue is tan-brown.  Representative tissue is submitted in

A1 after decalcification.

([facility]; 10/22/2018)

SHS/SHS

.                                                                02

Pathologist provided ICD-10:

M17.11

.                                                                02

CPT                                                        .

002737, 181823

Specimen Comment: A courtesy copy of this report has been sent to

Specimen Comment: [phone], [phone].

Specimen Comment: Report sent to  / DR AKHTAR

Specimen Comment: A duplicate report has been generated due to demographic updates.

***Performed at:  01

   [facility]

   [address]  [id number]

   MD Michi Elias MD Phone:  [phone]

***Performed at:  02

   [facility]

   [address] [place], [place]  [id number]

   MD Nas Perez MD Phone:  [phone]

## 2018-10-25 VITALS — DIASTOLIC BLOOD PRESSURE: 64 MMHG | SYSTOLIC BLOOD PRESSURE: 105 MMHG

## 2018-10-25 VITALS
DIASTOLIC BLOOD PRESSURE: 63 MMHG | SYSTOLIC BLOOD PRESSURE: 99 MMHG | DIASTOLIC BLOOD PRESSURE: 63 MMHG | SYSTOLIC BLOOD PRESSURE: 99 MMHG

## 2018-10-25 VITALS — SYSTOLIC BLOOD PRESSURE: 99 MMHG | DIASTOLIC BLOOD PRESSURE: 63 MMHG

## 2018-10-25 LAB
HCT VFR BLD CALC: 34.8 % (ref 36–47)
HGB BLD-MCNC: 11.6 G/DL (ref 12–15.5)
MCHC RBC AUTO-ENTMCNC: 34 G/DL (ref 31–37)
PROTHROMBIN TIME: 17 SEC (ref 11.7–14)

## 2018-10-25 RX ADMIN — OXYCODONE HYDROCHLORIDE AND ACETAMINOPHEN PRN TAB: 7.5; 325 TABLET ORAL at 02:59

## 2018-10-25 RX ADMIN — Medication SCH MG: at 08:57

## 2018-10-25 RX ADMIN — MULTIPLE VITAMINS W/ MINERALS TAB SCH TAB: TAB at 08:57

## 2018-10-25 RX ADMIN — LISINOPRIL SCH MG: 20 TABLET ORAL at 08:57

## 2018-10-25 RX ADMIN — OXYCODONE HYDROCHLORIDE AND ACETAMINOPHEN PRN TAB: 7.5; 325 TABLET ORAL at 07:28

## 2018-10-25 RX ADMIN — CELECOXIB SCH MG: 100 CAPSULE ORAL at 08:57

## 2018-10-25 RX ADMIN — OXYCODONE HYDROCHLORIDE AND ACETAMINOPHEN PRN TAB: 7.5; 325 TABLET ORAL at 10:48

## 2018-10-25 RX ADMIN — SENNOSIDES AND DOCUSATE SODIUM SCH TAB: 8.6; 5 TABLET ORAL at 08:57

## 2018-10-25 RX ADMIN — Medication PRN EACH: at 10:29

## 2018-10-25 NOTE — PDOC3
Discharge Summary


Visit Information


Date of Admission:  Oct 22, 2018


Date of Discharge:  Oct 25, 2018


Admitting Diagnosis:  advanced right knee primary degenerative joint disease





Brief Hospital Course


Allergies





 Allergies








Coded Allergies Type Severity Reaction Last Updated Verified


 


  Penicillins Allergy Intermediate hives 2/19/18 Yes


 


  adhesive tape Allergy Unknown TAPE USED ON KNEE SURGERY, CAUSED BLISTERS 10/25

/18 Yes


 


  coconut Adverse Reaction Intermediate Rash 2/19/18 Yes


 


  morphine Adverse Reaction Intermediate Nausea and Vomiting 2/19/18 Yes








Vital Signs





Vital Signs








  Date Time  Temp Pulse Resp B/P (MAP) Pulse Ox O2 Delivery O2 Flow Rate FiO2


 


10/25/18 08:57  76  99/63    


 


10/25/18 07:28      Room Air  


 


10/25/18 05:47 98.0  20  99   





 98.0       








Lab Results





Laboratory Tests








Test


 10/24/18


04:00 10/25/18


04:43


 


Hemoglobin


 11.1 g/dL


(12.0-15.5) 11.6 g/dL


(12.0-15.5)


 


Hematocrit


 32.1 %


(36.0-47.0) 34.8 %


(36.0-47.0)


 


Mean Corpuscular Hemoglobin


Concent 35 g/dL


(31-37) 34 g/dL


(31-37)


 


Prothrombin Time


 18.1 SEC


(11.7-14.0) 17.0 SEC


(11.7-14.0)


 


Prothromb Time International


Ratio 1.6 (0.8-1.1) 


 1.4 (0.8-1.1) 











Laboratory Tests








Test


 10/25/18


04:43


 


Hemoglobin


 11.6 g/dL


(12.0-15.5)


 


Hematocrit


 34.8 %


(36.0-47.0)


 


Mean Corpuscular Hemoglobin


Concent 34 g/dL


(31-37)


 


Prothrombin Time


 17.0 SEC


(11.7-14.0)


 


Prothromb Time International


Ratio 1.4 (0.8-1.1) 











Brief Hospital Course


Ms. Leal  is a 58 old female who presented to my outpatient orthopedic 

surgery clinic with complaints of severe and progressive  pain that failed 

conservative therapies including injections.  We had a discussion of the risks, 

benefits, alternatives to total knee arthroplasty and she elected to proceed.  

She tolerated surgery well and recovered well from anesthesia in the PACU.  She 

was then taken to the joint Center for care and observation.  She did receive PT

, OT, DVT and antibiotic prophylaxis.  She recovered well from surgery and 

remained hemodynamically stable and afebrile throughout the hospitalization.  

Pain was controlled on oral pain medicine at the time of discharge.  Good 

progress was made with therapy throughout the hospitalization, and activities 

of daily living were accomplished by the patient. The incision was clean dry 

and intact and the operative extremity had normal motor and sensation.





Discharge Information


Condition at Discharge:  Stable


Follow Up:  Weeks


Disposition/Orders:  D/C to Home w/ HH


Scheduled


Ferrous Sulfate (Ferrous Sulfate) 325 Mg Tablet, 325 MG PO DAILY, (Reported)


   Entered as Reported by: GASTON HENDRICKSON on 10/8/18 1020


   Last Taken: Unknown Dose on 10/21/18      Last Action: HELD on 10/22/18 0936 

by PURA ALEXANDER MD


Gabapentin (Gabapentin) 600 Mg Tablet, 600 MG PO HS, (Reported)


   Entered as Reported by: GASTON HENDRICKSON on 10/8/18 1015


   Last Taken: Unknown Dose on 10/21/18      Last Action: Converted on 10/22/18 

0936 by PURA ALEXANDER MD


Lisinopril/Hydrochlorothiazide (Lisinopril-Hctz 20-12.5 Mg Tab) 1 Each Tablet, 

1 TAB PO DAILY, #30 Ref 5 (Reported)


   Entered as Reported by: GASTON HENDRICKSON on 10/8/18 1144


   Last Taken: Unknown Dose on 10/21/18      Last Action: Converted on 10/22/18 

0936 by PURA ALEXANDER MD





Scheduled PRN


Tizanidine Hcl (Tizanidine Hcl) 4 Mg Tablet, 4 MG PO HS PRN for MUSCLE SPASMS, (

Reported)


   Entered as Reported by: GASTON HENDRICKSON on 10/8/18 1016


   Last Taken: Unknown Dose on 10/21/18      Last Action: Converted on 10/22/18 

0936 by PURA ALEXANDER MD





Discontinued Medications


Meloxicam (Meloxicam) 15 Mg Tablet, 15 MG PO DAILY, (Reported)


   Entered as Reported by: GASTON HENDRICKSON on 10/8/18 1015


   Last Taken: Unknown Dose on 10/21/18      Last Action: HELD on 10/22/18 0936 

by PURA ALEXANDER MD





Patient Instructions


Patient Instructions


She will be discharged home.  Home health care has been set up.  We will get 

her started on outpatient therapy as soon as we are able.  The patient will be 

on Coumadin for a month.  She can weight-bear as tolerated.  Worrisome signs 

and symptoms that should prompt a phone call to my office were discussed.  We'

ll see her back in 2 weeks, sooner should a problem arise.











YADIRA ALEXANDER II, MD Oct 25, 2018 13:06

## 2018-10-25 NOTE — PDOC
ORTHO PROGRESS NOTES


Subjective


Shantell's pain is about the same. No new concerns.


Vitals





Vital Signs








  Date Time  Temp Pulse Resp B/P (MAP) Pulse Ox O2 Delivery O2 Flow Rate FiO2


 


10/25/18 08:57  76  99/63    


 


10/25/18 07:28      Room Air  


 


10/25/18 05:47 98.0  20  99   





 98.0       








Labs





Laboratory Tests








Test


 10/24/18


04:00 10/25/18


04:43


 


Hemoglobin


 11.1 g/dL


(12.0-15.5) 11.6 g/dL


(12.0-15.5)


 


Hematocrit


 32.1 %


(36.0-47.0) 34.8 %


(36.0-47.0)


 


Mean Corpuscular Hemoglobin


Concent 35 g/dL


(31-37) 34 g/dL


(31-37)


 


Prothrombin Time


 18.1 SEC


(11.7-14.0) 17.0 SEC


(11.7-14.0)


 


Prothromb Time International


Ratio 1.6 (0.8-1.1) 


 1.4 (0.8-1.1) 











Laboratory Tests








Test


 10/25/18


04:43


 


Hemoglobin


 11.6 g/dL


(12.0-15.5)


 


Hematocrit


 34.8 %


(36.0-47.0)


 


Mean Corpuscular Hemoglobin


Concent 34 g/dL


(31-37)


 


Prothrombin Time


 17.0 SEC


(11.7-14.0)


 


Prothromb Time International


Ratio 1.4 (0.8-1.1) 











Notes


She is awake and alert and sitting in a chair. Her soft tissues show some 

effusion or edema but look like they're healing appropriately. For a small 

amount of drainage at the dressing. She remains neurovascularly intact in her 

operative extremity


Assessment and Plan


She can be discharged home with home health care. Okay to weight-bear as 

tolerated. Coumadin for 1 month. We will see her back in 2 weeks.











YADIRA ALEXANDER II, MD Oct 25, 2018 13:07

## 2019-02-04 ENCOUNTER — HOSPITAL ENCOUNTER (OUTPATIENT)
Dept: HOSPITAL 61 - KCIC US | Age: 59
Discharge: HOME | End: 2019-02-04
Attending: ORTHOPAEDIC SURGERY
Payer: COMMERCIAL

## 2019-02-04 DIAGNOSIS — M25.862: Primary | ICD-10-CM

## 2019-02-04 PROCEDURE — 76881 US COMPL JOINT R-T W/IMG: CPT

## 2019-02-04 NOTE — KCIC
Examination: Ultrasound left lower extremity

 

HISTORY: History of posterior left knee cyst/lump in the posterior thigh 

region.

 

COMPARISON: None available

 

FINDINGS:

 

Ultrasound of the left posterior thigh and popliteal region are performed.

No evidence of  cysts or masses identified in the posterior thigh or the 

popliteal region. No evidence of DVT in the popliteal region.

 

IMPRESSION:

 

No evidence of deep venous thrombosis or cyst or masses identified in the 

posterior thigh and gluteal region.

 

Electronically signed by: Juanito Duran MD (2/4/2019 8:56 AM) [place] [id number]

## 2020-10-20 ENCOUNTER — HOSPITAL ENCOUNTER (EMERGENCY)
Dept: HOSPITAL 61 - ER | Age: 60
Discharge: HOME | End: 2020-10-20
Payer: MEDICAID

## 2020-10-20 VITALS — WEIGHT: 230.38 LBS | BODY MASS INDEX: 36.16 KG/M2 | HEIGHT: 67 IN

## 2020-10-20 VITALS — SYSTOLIC BLOOD PRESSURE: 155 MMHG | DIASTOLIC BLOOD PRESSURE: 87 MMHG
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
## 2020-10-20 DIAGNOSIS — Z91.018: ICD-10-CM

## 2020-10-20 DIAGNOSIS — Z88.5: ICD-10-CM

## 2020-10-20 DIAGNOSIS — S63.051A: Primary | ICD-10-CM

## 2020-10-20 DIAGNOSIS — M19.041: ICD-10-CM
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
## 2020-10-20 DIAGNOSIS — F17.200: ICD-10-CM

## 2020-10-20 DIAGNOSIS — Z88.0: ICD-10-CM

## 2020-10-20 DIAGNOSIS — Z88.8: ICD-10-CM

## 2020-10-20 DIAGNOSIS — E11.9: ICD-10-CM

## 2020-10-20 DIAGNOSIS — I10: ICD-10-CM
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
## 2020-10-20 PROCEDURE — 99283 EMERGENCY DEPT VISIT LOW MDM: CPT

## 2020-10-20 PROCEDURE — 29125 APPL SHORT ARM SPLINT STATIC: CPT

## 2020-10-20 PROCEDURE — 73130 X-RAY EXAM OF HAND: CPT

## 2020-10-20 NOTE — RAD
HAND RIGHT 3V

 

History: Reason: rt hand pain injury x1 month ago / Spl. Instructions:  / 

History: 

 

Technique: 3 views right hand.

 

Comparison: None.

 

Findings:

Normal alignment. No fracture. Advanced first metacarpophalangeal 

degenerative changes with radial subluxation

Fragmentation.

 

Impression: 

1.  No acute osseous abnormality.

2.  Advanced first carpometacarpal DJD with subluxation and fragmentation.

 

Electronically signed by: Tomy Leong DO (10/20/2020 12:56 PM) [id number]

## 2020-10-20 NOTE — PHYS DOC
Past Medical History


Past Medical History:  Diabetes-Type II, Hypertension


Past Surgical History:  Appendectomy, Other


Additional Past Surgical Histo:  laproscopic, bilateral knee replacement


Smoking Status:  Current Every Day Smoker


Alcohol Use:  None


Drug Use:  None





General Adult


EDM:


Chief Complaint:  HAND PROBLEM





HPI:


HPI:





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 

that were not sharp in.  She states when she went to cut a thick stand plant 

that she had to use both hands and when this happened she felt a sharp pain in 

the right knuckle and then had swelling.  She states since then she is continue 

to work but she has noticed pain when she goes to make a fist or tries to grasp 

something.  She states it is a sharp shooting pain and a slight burning just 

over the knuckle area.  The sharp shooting pain will go up into her anterior 

arm.  She denies numbness or tingling, coolness of the extremity, skin color 

changes.  He states that she is currently on Diflucan and gabapentin for 

neuropathy.  Patient has had bilateral knee replacements by Dr. Gee.  Patient 

has a history of diabetes, smoker, hypertension, appendectomy.  She rates her 

pain with movement at a 7 out of 10.  She states she does not want any other 

pain medicines.





Review of Systems:


Review of Systems:


Constitutional:   Denies fever or chills. []


Eyes:   Denies change in visual acuity. []


HENT:   Denies nasal congestion or sore throat. [] 


Respiratory:   Denies cough or shortness of breath. [] 


Cardiovascular:   Denies chest pain.  Right middle knuckle 2+ edema. [] 


GI:   Denies abdominal pain, nausea, vomiting, bloody stools or diarrhea. [] 


:  Denies dysuria. [] 


Musculoskeletal:   Denies back pain or joint pain.  Right hand [] 


Integument:   Denies rash. [] 


Neurologic:   Denies headache, focal weakness or sensory changes. [] 


Endocrine:   Denies polyuria or polydipsia. [] 


Lymphatic:  Denies swollen glands. [] 


Psychiatric:  Denies depression or anxiety. []





Heart Score:


Risk Factors:


Risk Factors:  DM, Current or recent (<one month) smoker, HTN, HLP, family 

history of CAD, obesity.


Risk Scores:


Score 0 - 3:  2.5% MACE over next 6 weeks - Discharge Home


Score 4 - 6:  20.3% MACE over next 6 weeks - Admit for Clinical Observation


Score 7 - 10:  72.7% MACE over next 6 weeks - Early Invasive Strategies





Allergies:


Allergies:





Allergies








Coded Allergies Type Severity Reaction Last Updated Verified


 


  Penicillins Allergy Intermediate hives 18 Yes


 


  adhesive tape Allergy Unknown TAPE USED ON KNEE SURGERY, CAUSED BLISTERS 

10/25/18 Yes


 


  coconut Adverse Reaction Intermediate Rash 18 Yes


 


  morphine Adverse Reaction Intermediate Nausea and Vomiting 18 Yes











Physical Exam:


PE:





Constitutional: Well developed, well nourished, no acute distress, non-toxic 

appearance. []


HENT: Normocephalic, atraumatic, bilateral external ears normal, oropharynx 

moist, no oral exudates, nose normal. []


Eyes: PERRLA, EOMI, conjunctiva normal, no discharge. [] 


Neck: Normal range of motion, no tenderness, supple, no stridor. [] 


Cardiovascular:Heart rate regular rhythm, no murmur []


Lungs & Thorax:  Bilateral breath sounds clear to auscultation []


Abdomen: Bowel sounds normal, soft, no tenderness, no masses, no pulsatile 

masses. [] 


Skin: Warm, dry, no erythema, no rash. [] 


Back: No tenderness, no CVA tenderness. [] 


Extremities: Right middle knuckle and finger tenderness, no cyanosis, no 

clubbing, right middle finger ROM not intact, right knuckle 2+ edema. [] 


Neurologic: Alert and oriented X 3, normal motor function, normal sensory 

function, no focal deficits noted. []


Psychologic: Affect normal, judgement normal, mood normal. []





Current Patient Data:


Vital Signs:





                                   Vital Signs








  Date Time  Temp Pulse Resp B/P (MAP) Pulse Ox O2 Delivery O2 Flow Rate FiO2


 


10/20/20 12:00 97.2 85 18 155/87 (109) 95 Room Air  





 97.2       











EKG:


EKG:


[]





Radiology/Procedures:


Radiology/Procedures:


[]


Impression:


                            [facility]


                    [address]  [place], [place] [id number] [phone]


                                        


                                 IMAGING REPORT





                                     Signed





PATIENT: BEVERLY BELL  ACCOUNT: [id number]     MRN#: [id number]


: 1960           LOCATION: ER              AGE: 60


SEX: F                    EXAM DT: 10/20/20         ACCESSION#: [id number]


STATUS: REG ER            ORD. PHYSICIAN: VANESSA NOBLES


REASON: rt hand pain injury x1 month ago


PROCEDURE: HAND RIGHT 3V





HAND RIGHT 3V


 


History: Reason: rt hand pain injury x1 month ago / Spl. Instructions:  / 


History: 


 


Technique: 3 views right hand.


 


Comparison: None.


 


Findings:


Normal alignment. No fracture. Advanced first metacarpophalangeal 


degenerative changes with radial subluxation


Fragmentation.


 


Impression: 


1.  No acute osseous abnormality.


2.  Advanced first carpometacarpal DJD with subluxation and fragmentation.


 


Electronically signed by: Tomy Leong DO (10/20/2020 12:56 PM) [id number]














DICTATED and SIGNED BY:     TOMY LEONG DO


DATE:     10/20/20 125





Course & Med Decision Making:


Course & Med Decision Making


Pertinent Labs and Imaging studies reviewed. (See chart for details)





Radial pulse strong present.  Alert and oriented x4.  Patient ambulatory steady 

gait.  Patient can make a fist but it is weaker than the left hand.  Movement 

causes pain.  Patient rates her pain a 7 out of 10.  Pain is mostly with 

movement.  Cap refills less than 2 seconds.  No joint laxities.





X-ray shows Impression: 


1.  No acute osseous abnormality.


2.  Advanced first carpometacarpal DJD with subluxation and fragmentation.





Patient placed in a Volar splint and to follow up with [facility]. Patient to 

continue taking diclofenac and gabapentin for pain as she has requested.





***Splint assessment: Neurovascularly intact post splint replacement with good 

fit.





Patient's extremity symptoms have stabilized well they have been evaluated in 

the department and are appropriate for outpatient follow-up.  No evidence of 

compartment syndrome, neurologic injury, vascular injury, open joint, open 

fracture, tendon laceration, or foreign body.





[]





Dragon Disclaimer:


Dragon Disclaimer:


This electronic medical record was generated, in whole or in part, using a voice

 recognition dictation system.





Departure


Departure


Impression:  


   Primary Impression:  


   Degenerative joint disease of right hand


   Qualified Codes:  M19.041 - Primary osteoarthritis, right hand


   Additional Impression:  


   Subluxation of other carpometacarpal joint of left hand, initial encounter


Disposition:  01 DC HOME SELF CARE/HOMELESS


Condition:  STABLE


Referrals:  


DEVYN AKHTAR (PCP)








YADIRA ALEXANDER II, MD


Patient Instructions:  Arthritis, Degenerative-Brief, Dislocation or 

Subluxation-SportsMed





Additional Instructions:  


Follow-up with Dr. Alexander. Wear splint and continue taking your pain medications

 at home.  Can also use ice to help with pain.











VANESSA NOBLES            Oct 20, 2020 13:24